# Patient Record
Sex: MALE | Race: WHITE | Employment: FULL TIME | URBAN - METROPOLITAN AREA
[De-identification: names, ages, dates, MRNs, and addresses within clinical notes are randomized per-mention and may not be internally consistent; named-entity substitution may affect disease eponyms.]

---

## 2017-02-10 ENCOUNTER — TELEPHONE (OUTPATIENT)
Dept: FAMILY MEDICINE CLINIC | Facility: CLINIC | Age: 27
End: 2017-02-10

## 2017-02-13 ENCOUNTER — LAB ENCOUNTER (OUTPATIENT)
Dept: LAB | Age: 27
End: 2017-02-13
Attending: INTERNAL MEDICINE
Payer: COMMERCIAL

## 2017-02-13 ENCOUNTER — OFFICE VISIT (OUTPATIENT)
Dept: FAMILY MEDICINE CLINIC | Facility: CLINIC | Age: 27
End: 2017-02-13

## 2017-02-13 VITALS
BODY MASS INDEX: 29.66 KG/M2 | WEIGHT: 219 LBS | RESPIRATION RATE: 16 BRPM | TEMPERATURE: 98 F | DIASTOLIC BLOOD PRESSURE: 80 MMHG | HEIGHT: 72 IN | HEART RATE: 58 BPM | SYSTOLIC BLOOD PRESSURE: 120 MMHG

## 2017-02-13 DIAGNOSIS — D69.3 IDIOPATHIC THROMBOCYTOPENIC PURPURA (HCC): ICD-10-CM

## 2017-02-13 DIAGNOSIS — L65.9 ALOPECIA: ICD-10-CM

## 2017-02-13 DIAGNOSIS — R79.89 ABNORMAL LFTS: ICD-10-CM

## 2017-02-13 DIAGNOSIS — Z00.00 ROUTINE GENERAL MEDICAL EXAMINATION AT A HEALTH CARE FACILITY: Primary | ICD-10-CM

## 2017-02-13 DIAGNOSIS — Z00.00 ROUTINE GENERAL MEDICAL EXAMINATION AT A HEALTH CARE FACILITY: ICD-10-CM

## 2017-02-13 LAB
25-HYDROXYVITAMIN D (TOTAL): 34.7 NG/ML (ref 30–100)
ALBUMIN SERPL-MCNC: 3.7 G/DL (ref 3.5–4.8)
ALP LIVER SERPL-CCNC: 53 U/L (ref 45–117)
ALT SERPL-CCNC: 95 U/L (ref 17–63)
AST SERPL-CCNC: 87 U/L (ref 15–41)
BASOPHILS # BLD AUTO: 0.05 X10(3) UL (ref 0–0.1)
BASOPHILS NFR BLD AUTO: 0.9 %
BILIRUB SERPL-MCNC: 0.4 MG/DL (ref 0.1–2)
BUN BLD-MCNC: 16 MG/DL (ref 8–20)
CALCIUM BLD-MCNC: 8.8 MG/DL (ref 8.3–10.3)
CHLORIDE: 106 MMOL/L (ref 101–111)
CHOLEST SMN-MCNC: 124 MG/DL (ref ?–200)
CK: 1948 IU/L (ref 39–308)
CKMB: 19 NG/ML (ref 0–5)
CO2: 27 MMOL/L (ref 22–32)
CREAT BLD-MCNC: 1.24 MG/DL (ref 0.7–1.3)
EOSINOPHIL # BLD AUTO: 0.13 X10(3) UL (ref 0–0.3)
EOSINOPHIL NFR BLD AUTO: 2.2 %
ERYTHROCYTE [DISTWIDTH] IN BLOOD BY AUTOMATED COUNT: 11.9 % (ref 11.5–16)
FOLATE (FOLIC ACID), SERUM: 19.9 NG/ML (ref 8.7–24)
GLUCOSE BLD-MCNC: 91 MG/DL (ref 70–99)
HAV AB SERPL IA-ACNC: 974 PG/ML (ref 193–986)
HCT VFR BLD AUTO: 42.7 % (ref 37–53)
HDLC SERPL-MCNC: 51 MG/DL (ref 45–?)
HDLC SERPL: 2.43 {RATIO} (ref ?–4.97)
HGB BLD-MCNC: 14.8 G/DL (ref 13–17)
IMMATURE GRANULOCYTE COUNT: 0.02 X10(3) UL (ref 0–1)
IMMATURE GRANULOCYTE RATIO %: 0.3 %
IRON SATURATION: 31 % (ref 13–45)
IRON: 111 UG/DL (ref 45–182)
LDLC SERPL CALC-MCNC: 65 MG/DL (ref ?–130)
LYMPHOCYTES # BLD AUTO: 1.8 X10(3) UL (ref 0.9–4)
LYMPHOCYTES NFR BLD AUTO: 30.9 %
M PROTEIN MFR SERPL ELPH: 6.6 G/DL (ref 6.1–8.3)
MB INDEX: 1 % (ref ?–4)
MCH RBC QN AUTO: 31.5 PG (ref 27–33.2)
MCHC RBC AUTO-ENTMCNC: 34.7 G/DL (ref 31–37)
MCV RBC AUTO: 90.9 FL (ref 80–99)
MONOCYTES # BLD AUTO: 0.55 X10(3) UL (ref 0.1–0.6)
MONOCYTES NFR BLD AUTO: 9.5 %
NEUTROPHIL ABS PRELIM: 3.27 X10 (3) UL (ref 1.3–6.7)
NEUTROPHILS # BLD AUTO: 3.27 X10(3) UL (ref 1.3–6.7)
NEUTROPHILS NFR BLD AUTO: 56.2 %
NONHDLC SERPL-MCNC: 73 MG/DL (ref ?–130)
PLATELET # BLD AUTO: 99 10(3)UL (ref 150–450)
POTASSIUM SERPL-SCNC: 4.4 MMOL/L (ref 3.6–5.1)
RBC # BLD AUTO: 4.7 X10(6)UL (ref 4.3–5.7)
RED CELL DISTRIBUTION WIDTH-SD: 40.2 FL (ref 35.1–46.3)
SED RATE-ML: 3 MM/HR (ref 0–12)
SODIUM SERPL-SCNC: 140 MMOL/L (ref 136–144)
TOTAL IRON BINDING CAPACITY: 358 UG/DL (ref 298–536)
TRANSFERRIN: 240 MG/DL (ref 200–360)
TRIGLYCERIDES: 41 MG/DL (ref ?–150)
VLDL: 8 MG/DL (ref 5–40)
WBC # BLD AUTO: 5.8 X10(3) UL (ref 4–13)

## 2017-02-13 PROCEDURE — 82746 ASSAY OF FOLIC ACID SERUM: CPT

## 2017-02-13 PROCEDURE — 85025 COMPLETE CBC W/AUTO DIFF WBC: CPT

## 2017-02-13 PROCEDURE — 80061 LIPID PANEL: CPT

## 2017-02-13 PROCEDURE — 36415 COLL VENOUS BLD VENIPUNCTURE: CPT

## 2017-02-13 PROCEDURE — 85652 RBC SED RATE AUTOMATED: CPT

## 2017-02-13 PROCEDURE — 82553 CREATINE MB FRACTION: CPT

## 2017-02-13 PROCEDURE — 86225 DNA ANTIBODY NATIVE: CPT

## 2017-02-13 PROCEDURE — 83550 IRON BINDING TEST: CPT

## 2017-02-13 PROCEDURE — 86038 ANTINUCLEAR ANTIBODIES: CPT

## 2017-02-13 PROCEDURE — 82550 ASSAY OF CK (CPK): CPT

## 2017-02-13 PROCEDURE — 86235 NUCLEAR ANTIGEN ANTIBODY: CPT

## 2017-02-13 PROCEDURE — 82607 VITAMIN B-12: CPT

## 2017-02-13 PROCEDURE — 83540 ASSAY OF IRON: CPT

## 2017-02-13 PROCEDURE — 80053 COMPREHEN METABOLIC PANEL: CPT

## 2017-02-13 PROCEDURE — 82306 VITAMIN D 25 HYDROXY: CPT

## 2017-02-13 PROCEDURE — 99395 PREV VISIT EST AGE 18-39: CPT | Performed by: INTERNAL MEDICINE

## 2017-02-13 NOTE — PATIENT INSTRUCTIONS
Thank you for choosing Hendrick Moritz, MD at [place] [address]  To Do: Subhash Argueta  1. Labs today  2. Consider valerian root or st johns wort for mood  Behavioral Health Integration provided by [address].   Zack Crawford is a licens certain cases  Abdominal Aneurysm- once Age 73-68 for male smokers  Immunizations- Tetanus every 10 years;  Influenza annually;   2 Pneumonia at age 72, or younger with immunity deficiency, asplenia,  chronic lung disease, chronic kidney or liver failure et and stop treatment if problems arise, but know that our intention is that the benefits outweigh those potential risks and we strive to make you healthier and to improve your quality of life.     Referrals, and Radiology Information:    If your insurance req

## 2017-02-13 NOTE — PROGRESS NOTES
Wellness Exam    REASON FOR VISIT:    Vamshi Finn is a 31year old male who presents for an [address]     Current Complaints: hair loss  Flu Shot: declined   Health Maintenance Topics with due status: Due Soon       Topic Date Due Services for Which Recommendations Vary with Risk Recommendation Internal Lab or Procedure External Lab or Procedure   Cholesterol Screening Recommended screening varies with age, risk and gender LDL CHOLESTEROL (mg/dL)   Date Value   03/08/2016 77     LDL Maternal Grandmother       SOCIAL HISTORY:     Smoking Status: Never Smoker                      Alcohol Use: Yes                Comment: 2x per month         REVIEW OF SYSTEMS:   Constitutional: Negative for fever, chills and fatigue.    HENT: Negative for normal reflexes. Skin: Skin is warm. No rash noted. No erythema. with brown hair  Psychiatric: He has a normal mood and affect.  His behavior is normal.     ASSESSMENT AND OTHER RELEVANT CHRONIC CONDITIONS:   Jaron Mas is a 31year old male who p Depression Screen due on 03/08/2017  Annual Physical due on 03/08/2017  Patient/Caregiver Education:  Patient/Caregiver Education: There are no barriers to learning. Medical education done. Outcome: Patient verbalizes understanding.     Educated by: MD DONALDSON

## 2017-02-14 ENCOUNTER — TELEPHONE (OUTPATIENT)
Dept: FAMILY MEDICINE CLINIC | Facility: CLINIC | Age: 27
End: 2017-02-14

## 2017-02-14 PROBLEM — M62.82 RHABDOMYOLYSIS: Status: ACTIVE | Noted: 2017-02-14

## 2017-02-14 NOTE — TELEPHONE ENCOUNTER
Per OV notes from 2/13/17 with Dr. Lewis Mcleod:    + stress from work  Feeling down, depressed, or hopeless (over the last two weeks)?: Several days   [address] provided by [address]   Maribel Edwards is a licensed clinical soci

## 2017-02-14 NOTE — TELEPHONE ENCOUNTER
Pt. Called stating that he saw Dr. Gela Miller yesterday & they discussed medication for anxiety & Dr. Keyon Braden him to think about if he wanted to try medication & he has decided he would like to try it. Please call an advice.

## 2017-02-14 NOTE — TELEPHONE ENCOUNTER
Patient was seen 2/13/17 and it is noted:  Left message for patient to call back to discuss.     2. Consider valerian root or st johns wort for mood

## 2017-02-15 ENCOUNTER — TELEPHONE (OUTPATIENT)
Dept: FAMILY MEDICINE CLINIC | Facility: CLINIC | Age: 27
End: 2017-02-15

## 2017-02-15 RX ORDER — CLONAZEPAM 0.5 MG/1
0.5 TABLET ORAL 2 TIMES DAILY PRN
Qty: 60 TABLET | Refills: 0 | Status: SHIPPED
Start: 2017-02-15

## 2017-02-15 NOTE — TELEPHONE ENCOUNTER
Patient states he get panic attacks intermittently - he is not depressed. He only wants medication when absolutely necessary. Does not want to be on a daily medication. Can you order something else for him to use prn only.

## 2017-02-15 NOTE — TELEPHONE ENCOUNTER
zoloft 50mg daily sent to [facility] to start  Tell him to make fu ov next month or see Georgie Daileyudent

## 2017-02-15 NOTE — TELEPHONE ENCOUNTER
Patient notified of new medication. He will schedule appointment with Veronica Kincaid and wants number sent to him on [employer] to contact as he is driving and cannot take it down right now. Information sent to patient on [employer].

## 2017-02-15 NOTE — TELEPHONE ENCOUNTER
Pt sts he does not want to take a medication for his anxiety everyday. Pt wants to know if there is a medication that he can take only if he needs too.   Sts he is not comfortable taking the    Sertraline HCl 50 MG Oral Tab 30 tablet 3 2/14/2017      Sig :

## 2017-02-16 LAB
ANA SCREEN: POSITIVE
CENTROMERE AUTOAB: <100 AU/ML (ref ?–100)
DSDNA AUTOAB: <100 IU/ML (ref ?–100)
HISTONE AUTOAB: <100 AU/ML (ref ?–100)
JO-1 AUTOAB: <100 AU/ML (ref ?–100)
RNP AUTOAB: 133 AU/ML (ref ?–100)
SCL-70 AUTOAB: <100 AU/ML (ref ?–100)
SM AUTOAB (SMITH): <100 AU/ML (ref ?–100)
SSA AUTOAB: <100 AU/ML (ref ?–100)
SSB AUTOAB: <100 AU/ML (ref ?–100)

## 2017-02-17 ENCOUNTER — TELEPHONE (OUTPATIENT)
Dept: FAMILY MEDICINE CLINIC | Facility: CLINIC | Age: 27
End: 2017-02-17

## 2017-02-17 PROBLEM — R76.8 ANA POSITIVE: Status: ACTIVE | Noted: 2017-02-17

## 2017-02-17 PROBLEM — M60.9 MYOSITIS: Status: ACTIVE | Noted: 2017-02-17

## 2017-02-17 NOTE — TELEPHONE ENCOUNTER
Patient notified of labs per MD result neil, verbalized understanding.  Patient also asked if it is okay if he has caffeine with clonazepam. Notified patient that they would probably cancel each other out and neither would be very effective but there is no

## 2017-02-22 ENCOUNTER — TELEPHONE (OUTPATIENT)
Dept: FAMILY MEDICINE CLINIC | Facility: CLINIC | Age: 27
End: 2017-02-22

## 2017-02-22 NOTE — TELEPHONE ENCOUNTER
No hormone labs completed.  Patient states he thought testosterone and other hormones was apart of a yearly panel, patient notified this is not typical. Offered to ask MD to order these test.Patient declined at this time and said he will call back if he wou

## 2019-05-08 ENCOUNTER — TELEPHONE (OUTPATIENT)
Dept: FAMILY MEDICINE CLINIC | Facility: CLINIC | Age: 29
End: 2019-05-08

## 2019-07-12 ENCOUNTER — APPOINTMENT (OUTPATIENT)
Dept: OTHER | Age: 29
End: 2019-07-12
Attending: FAMILY MEDICINE

## 2019-07-15 ENCOUNTER — APPOINTMENT (OUTPATIENT)
Dept: OTHER | Age: 29
End: 2019-07-15
Attending: FAMILY MEDICINE

## (undated) NOTE — MR AVS SNAPSHOT
[facility] [address]  [address] [facility][note 3]  [phone]               Thank you for choosing us for your health care visit with Radha Cui MD.  We are glad to serve you and happy to provide you with t Lung Cancer Screening- Annually at age 46-80 for smokers of 30 pack years  Diabetes Screening- At your annual healthy adult exam, more often in cases  Glaucoma- Every 5 years, or more if there’s a Family History or   Breast Cancer Screening •Cardiac Testing in ER building Building A second floor Cardiac Testing [phone] (For example: Holter Monitor, Cardiac Stress tests,Event Monitor, or 2D Echocardiograms)  •[facility] call [phone] usually in [address] in Clinic in We cannot refill your maintenance medications at a preventative wellness visit. To best provide you care, patients receiving maintenance medications need to be seen at least twice a year. .         Allergies as of Feb 13, 2017     No Known Allergies You can access your MyChart to more actively manage your health care and view more details from this visit by going to [email or web address].   If you've recently had a stay at the Hospital you can access your discharge instructions in [address] by skye You don’t need to join a gym. Home exercises work great.  Put more priority on exercise in your life                    Visit [facility] online at  [email or web address]